# Patient Record
Sex: FEMALE | Race: WHITE
[De-identification: names, ages, dates, MRNs, and addresses within clinical notes are randomized per-mention and may not be internally consistent; named-entity substitution may affect disease eponyms.]

---

## 2022-12-09 ENCOUNTER — HOSPITAL ENCOUNTER (EMERGENCY)
Dept: HOSPITAL 4 - SED | Age: 27
Discharge: HOME | End: 2022-12-09
Payer: MEDICAID

## 2022-12-09 VITALS — WEIGHT: 98 LBS | BODY MASS INDEX: 19.76 KG/M2 | HEIGHT: 59 IN

## 2022-12-09 VITALS — SYSTOLIC BLOOD PRESSURE: 130 MMHG

## 2022-12-09 VITALS — SYSTOLIC BLOOD PRESSURE: 100 MMHG

## 2022-12-09 DIAGNOSIS — U07.1: Primary | ICD-10-CM

## 2022-12-09 DIAGNOSIS — R06.02: ICD-10-CM

## 2022-12-09 DIAGNOSIS — R11.10: ICD-10-CM

## 2022-12-09 DIAGNOSIS — M79.10: ICD-10-CM

## 2022-12-09 DIAGNOSIS — R19.7: ICD-10-CM

## 2022-12-09 DIAGNOSIS — Z79.899: ICD-10-CM

## 2022-12-09 LAB
ALBUMIN SERPL BCP-MCNC: 4.8 G/DL (ref 3.4–4.8)
ALT SERPL W P-5'-P-CCNC: 10 U/L (ref 12–78)
ANION GAP SERPL CALCULATED.3IONS-SCNC: 14 MMOL/L (ref 5–15)
AST SERPL W P-5'-P-CCNC: 20 U/L (ref 10–37)
BASOPHILS NFR BLD MANUAL: 0 % (ref 0–2)
BILIRUB SERPL-MCNC: 0.5 MG/DL (ref 0–1)
BUN SERPL-MCNC: 12 MG/DL (ref 8–21)
CALCIUM SERPL-MCNC: 9.9 MG/DL (ref 8.4–11)
CHLORIDE SERPL-SCNC: 100 MMOL/L (ref 98–107)
CREAT SERPL-MCNC: 0.88 MG/DL (ref 0.55–1.3)
EOSINOPHIL NFR BLD MANUAL: 0 % (ref 0–7)
ERYTHROCYTE [DISTWIDTH] IN BLOOD BY AUTOMATED COUNT: 12.7 % (ref 9–15)
GFR SERPL CREATININE-BSD FRML MDRD: 98 ML/MIN (ref 90–?)
GLUCOSE SERPL-MCNC: 94 MG/DL (ref 70–99)
HCT VFR BLD AUTO: 42.7 % (ref 36–48)
HGB BLD-MCNC: 14.8 G/DL (ref 12–16)
LYMPHOCYTES NFR BLD MANUAL: 7 % (ref 20–46)
MCH RBC QN AUTO: 34 PG (ref 27–31)
MCHC RBC AUTO-ENTMCNC: 35 % (ref 32–36)
MCV RBC AUTO: 97 FL (ref 79–98)
MONOCYTES # BLD MANUAL: 25 % (ref 0–11)
NEUTS BAND NFR BLD MANUAL: 3 % (ref 0–6)
PLATELET # BLD AUTO: 177 K/UL (ref 130–430)
RBC # BLD AUTO: 4.42 MIL/UL (ref 4.2–6.2)
WBC # BLD AUTO: 2.8 K/UL (ref 4.8–10.8)

## 2022-12-09 PROCEDURE — 96375 TX/PRO/DX INJ NEW DRUG ADDON: CPT

## 2022-12-09 PROCEDURE — 96374 THER/PROPH/DIAG INJ IV PUSH: CPT

## 2022-12-09 PROCEDURE — 85007 BL SMEAR W/DIFF WBC COUNT: CPT

## 2022-12-09 PROCEDURE — 87804 INFLUENZA ASSAY W/OPTIC: CPT

## 2022-12-09 PROCEDURE — 87426 SARSCOV CORONAVIRUS AG IA: CPT

## 2022-12-09 PROCEDURE — 36415 COLL VENOUS BLD VENIPUNCTURE: CPT

## 2022-12-09 PROCEDURE — 96361 HYDRATE IV INFUSION ADD-ON: CPT

## 2022-12-09 PROCEDURE — 85027 COMPLETE CBC AUTOMATED: CPT

## 2022-12-09 PROCEDURE — 80053 COMPREHEN METABOLIC PANEL: CPT

## 2022-12-09 PROCEDURE — 84484 ASSAY OF TROPONIN QUANT: CPT

## 2022-12-09 PROCEDURE — 99284 EMERGENCY DEPT VISIT MOD MDM: CPT

## 2022-12-09 NOTE — NUR
Pt complains of sore throat, fever chills, nausea vomiting episode for 2 days. 
No past medical history.

## 2022-12-09 NOTE — NUR
Medicated per MD orders. IVF infusing with no s/s of infiltration at this time. 
Will cont to monitor

## 2022-12-09 NOTE — NUR
# 20 gauge angiocath placed to right AC.  Use of asceptic technique.  Opsite 
placed over site.  Blood return noted.  Blood for lab drawn from site.  Flushed 
with 10 cc of normal saline.  No evidence of infiltration noted.  Patient 
tolerated well.